# Patient Record
Sex: FEMALE | Race: WHITE
[De-identification: names, ages, dates, MRNs, and addresses within clinical notes are randomized per-mention and may not be internally consistent; named-entity substitution may affect disease eponyms.]

---

## 2017-06-15 ENCOUNTER — HOSPITAL ENCOUNTER (OUTPATIENT)
Dept: HOSPITAL 80 - BRMIMAGING | Age: 42
End: 2017-06-15
Attending: INTERNAL MEDICINE
Payer: COMMERCIAL

## 2017-06-15 DIAGNOSIS — M54.12: Primary | ICD-10-CM
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
## 2019-02-05 ENCOUNTER — HOSPITAL ENCOUNTER (OUTPATIENT)
Dept: HOSPITAL 80 - FSGY | Age: 44
Discharge: HOME | End: 2019-02-05
Attending: OBSTETRICS & GYNECOLOGY
Payer: COMMERCIAL

## 2019-02-05 VITALS — DIASTOLIC BLOOD PRESSURE: 60 MMHG | SYSTOLIC BLOOD PRESSURE: 102 MMHG

## 2019-02-05 DIAGNOSIS — N93.8: ICD-10-CM

## 2019-02-05 DIAGNOSIS — N84.1: ICD-10-CM

## 2019-02-05 DIAGNOSIS — N92.0: Primary | ICD-10-CM

## 2019-02-05 PROCEDURE — C1782 MORCELLATOR: HCPCS

## 2019-02-05 PROCEDURE — 58563 HYSTEROSCOPY ABLATION: CPT

## 2019-02-05 RX ADMIN — Medication PRN MCG: at 09:29

## 2019-02-05 RX ADMIN — Medication PRN MCG: at 09:20

## 2019-02-05 NOTE — GOP
[f rep st]



                                                                OPERATIVE REPORT





DATE OF OPERATION:  2019



SURGEON:  Leidy Villegas DO



ANESTHESIA:  General with LMA.



ANESTHESIOLOGIST:  Jorge Munoz MD.



PREOPERATIVE DIAGNOSIS:  Endometrial polyp and menorrhagia.



POSTOPERATIVE DIAGNOSIS:  Endometrial polyp and menorrhagia.



PROCEDURE PERFORMED:  Hysteroscopy with morcellation of the endometrial tissue, endometrial polyp and
 an endometrial ablation.



FINDINGS:  

1.  Exam under anesthesia mobile midposition uterus with no adnexal masses.  Uterus sounded to 8 cm. 
 

2.  Hysteroscopic findings:  Thickened endometrium and a suspected endometrial polyp.



SPECIMENS:  Endometrial curettings and polyp.



INDICATIONS:  The patient is a 44-year-old,  3, para 2-0-1-2, who was seen for an annual exam 
in October and complained of very heavy periods and irregular spotting.  Pelvic ultrasound was perfor
med which showed an endometrial polyp at the fundus and the management options were reviewed with the
 patient.  The patient elected to proceed with a hysteroscopy with morcellation of the endometrial ti
ssue and an endometrial ablation.  She was made aware that if the pathology is abnormal she will need
 to undergo a hysterectomy.  Risks and benefits have been extensively reviewed with the patient and t
he patient was properly consented.



DESCRIPTION OF PROCEDURE:  Patient was taken to the operating room with intravenous fluids in place. 
 She was given 100 mg of doxycycline intravenously.  She was then placed on the operating room table 
in a dorsal supine position where general anesthesia was obtained.  She was then repositioned into th
e dorsal lithotomy position with the Yellofin stirrups and prepped and draped in normal sterile fashi
on.  Exam under anesthesia revealed a mobile, midposition uterus with no adnexal masses.  A speculum 
was then placed in the patient's vagina.  An Allis clamp was used to grasp the anterior lip of the ce
rvix and the cervix was then dilated to allow for the introduction of an operative hysteroscope.  The
 hysteroscope was then introduced with fluid medium running.  A thickened endometrial cavity was note
d at the fundus with possible polyp.  The morcellator was then introduced and morcellation was perfor
med of the endometrial tissue.  Following removal of the thickened endometrium and the polyp, no fibr
oids were noted and bilateral tubal ostia were visualized.  The hysteroscope was then withdrawn and n
o masses were noted within the endocervical canal.  The Delmis was then inserted with a cavity lengt
h of 4.5 cm.  The endometrial ablation was performed after the cavity assessment passed.  After compl
etion of the endometrial ablation, the Delmis was then withdrawn and the hysteroscope was reintroduc
ed and a diffusely ablated endometrial cavity was noted.  The hysteroscope was then withdrawn.  The i
nstruments were then removed from the patient's vagina.  No bleeding was noted.  Patient was returned
 to the dorsal supine position where she was easily awoken from anesthesia.  Sponge count was correct
.  The patient was transported to recovery room in stable condition.





Job #:  [id number]

## 2019-02-05 NOTE — PDANEPAE
ANE History of Present Illness


dysmenorhea, here for hysteroscopy 








ANE Past Medical History





- Cardiovascular History


Hx Hypertension: No


Hx Arrhythmias: No


Hx Chest Pain: No


Hx Coronary Artery / Peripheral Vascular Disease: No


Hx CHF / Valvular Disease: No


Hx Palpitations: No





- Pulmonary History


Hx COPD: No


Hx Asthma/Reactive Airway Disease: No


Hx Recent Upper Respiratory Infection: No


Hx Oxygen in Use at Home: No


Hx Sleep Apnea: No


Sleep Apnea Screening Result - Last Documented: Negative


Pulmonary History Comment: bronchiectasis on left lung, can trigger cough if 

she is laying flat on her back for too long





- Neurologic History


Hx Cerebrovascular Accident: No


Hx Seizures: No


Hx Dementia: No





- Endocrine History


Hx Diabetes: No





- Renal History


Hx Renal Disorders: Yes


Renal History Comment: hx of bladder sling





- Liver History


Hx Hepatic Disorders: No





- Neurological & Psychiatric Hx


Hx Neurological and Psychiatric Disorders: Yes


Neurological / Psychiatric History Comment: depression- well managed





- Cancer History


Hx Cancer: No





- Congenital Disorder History


Hx Congenital Disorders: No





- GI History


Hx Gastrointestinal Disorders: Yes


Gastrointestinal History Comment: chronic constipation uses fiber





- Other Health History


Other Health History: none





- Chronic Pain History


Chronic Pain: No





- Surgical History


Prior Surgeries: bladder sling.  lasik 08/2017.  bunionectomy bilaterally 2017.

  oophorectomy and appy 1992.  bilateral breast reduction 2009.  





ANE Review of Systems


Review of Systems: 








- Exercise capacity


METS (RN): 5 METS





ANE Patient History





- Allergies


Allergies/Adverse Reactions: 








No Known Allergies Allergy (Verified 01/30/19 14:53)


 








- Home Medications


Home Medications: 








Herbals/Supplements -Info Only  01/30/19 [Last Taken 01/30/19]








- NPO status


NPO Status: no food or drink >8 hours


NPO Since - Liquids (Date): 02/05/19


NPO Since - Liquids (Time): 04:00


NPO Since - Solids (Date): 02/04/19


NPO Since - Solids (Time): 19:00





- Anes Hx


Anes Hx: no prior problems





- Smoking Hx


Smoking Status: Never smoked





- Alcohol Use


Alcohol Use: Occasionally





- Family Anes Hx


Family Anes Hx: none


Family Hx Anesthesia Complications: none





ANE Labs/Vital Signs





- Vital Signs


Blood Pressure: 96/72


Heart Rate: 48


Respiratory Rate: 16


O2 Sat (%): 99


Height: 177.8 cm


Weight: 72.575 kg





ANE Physical Exam





- Airway


Neck exam: FROM


Mallampati Score: Class 2


Mouth exam: normal dental/mouth exam





- Pulmonary


Pulmonary: no respiratory distress, clear to auscultation





- Cardiovascular


Cardiovascular: regular rate and rhythym, no murmur, rub, or gallop





- ASA Status


ASA Status: II





ANE Anesthesia Plan


Anesthesia Plan: GA w LMA